# Patient Record
Sex: MALE | Race: ASIAN | NOT HISPANIC OR LATINO
[De-identification: names, ages, dates, MRNs, and addresses within clinical notes are randomized per-mention and may not be internally consistent; named-entity substitution may affect disease eponyms.]

---

## 2022-03-07 PROBLEM — Z00.00 ENCOUNTER FOR PREVENTIVE HEALTH EXAMINATION: Status: ACTIVE | Noted: 2022-03-07

## 2022-03-21 ENCOUNTER — NON-APPOINTMENT (OUTPATIENT)
Age: 32
End: 2022-03-21

## 2022-03-22 ENCOUNTER — NON-APPOINTMENT (OUTPATIENT)
Age: 32
End: 2022-03-22

## 2022-03-22 ENCOUNTER — APPOINTMENT (OUTPATIENT)
Dept: COLORECTAL SURGERY | Facility: CLINIC | Age: 32
End: 2022-03-22
Payer: MEDICAID

## 2022-03-22 VITALS
TEMPERATURE: 98.1 F | HEIGHT: 69 IN | DIASTOLIC BLOOD PRESSURE: 75 MMHG | WEIGHT: 172 LBS | HEART RATE: 80 BPM | SYSTOLIC BLOOD PRESSURE: 109 MMHG | BODY MASS INDEX: 25.48 KG/M2

## 2022-03-22 DIAGNOSIS — K60.2 ANAL FISSURE, UNSPECIFIED: ICD-10-CM

## 2022-03-22 PROCEDURE — 46600 DIAGNOSTIC ANOSCOPY SPX: CPT

## 2022-03-22 PROCEDURE — 99203 OFFICE O/P NEW LOW 30 MIN: CPT | Mod: 25

## 2022-03-22 NOTE — HISTORY OF PRESENT ILLNESS
[FreeTextEntry1] : 33 y/o M presents for initial evaluation \par Reason for visit " anorectal exam" \par \par \par pt report his partner told him he has hemorrhoid 6-7 months ago, but 2 weeks ago advised pt to be seen, which prompted him to come in today. \par pt feels he has a cut to external anus, onset 4-5 months ago. MSM, pt has partner with whom he tried anal receptive sex with partner at that time, and had episode of BRBPR and pain resolved after intercourse. feels pain with BMs intermittently since then. Denies burning, sharp pain, itching, inflammation, pressure.\par Denies use of creams or lotions to area.\par  \par BH: 1-3 x daily varies in stool quality, admits to straining. Admits he has hx constipation since child. \par Sometimes metamucil intermittently last taken last night. \par adequate dietary fiber intake. Drinking 2 L water per day. \par \par \par Denies FMH of colorectal cancer\par Never colonoscopy\par Pt took last evening ibuprofen after feeling sore from exercise. \par Denies allergy to latex. \par \par Pt takes truvada for prep. Pt takes medication for seasonal depression, unsure of name.

## 2022-03-22 NOTE — PHYSICAL EXAM
[FreeTextEntry1] : Medical assistant present for duration of physical examination\par \par General no acute distress, alert and oriented\par Psych calm, pleasant demeanor, responding appropriately to questions\par Nonlabored breathing\par Ambulating without assistance\par Skin normal color and pigment, no visible lesions or rashes\par \par Anorectal Exam:\par Inspection no erythema, induration or fluctuance, no skin excoriation, posterior midline anal fissure with small associated tag/pile, no external hemorrhoids\par ANJEL nontender, no masses palpated, no blood on gloved finger\par \par Procedure: Anoscopy\par \par Pre procedure Diagnosis: anal fissure\par Post procedure Diagnosis: anal fissure\par Anesthesia: none\par Estimated blood loss: none\par Specimen: none\par Complications: none\par \par Consent obtained. Anoscopy was performed by passing a lighted anoscope with lubricant jelly into the anal canal and the entire anal mucosal surface was inspected. Findings included posterior midline anal fissure, no internal hemorrhoidal inflammation.\par \par Patient tolerated examination and procedure well.\par \par \par

## 2022-03-22 NOTE — ASSESSMENT
[FreeTextEntry1] : Exam findings and diagnosis were discussed at length with patient. \par \par Anal fissure seen on exam. Patient reports symptoms began after attempt at anally receptive intercourse and have persisted since, noting pain during BM. Has not had anal intercourse since that experience.\par Sexual counseling provided. Home dilation discussed.\par \par Recommendations including increased fiber intake, adequate daily hydration, stool softeners as needed, and sitz baths as needed and after bowel movements were discussed.\par Medical management, such diltiazem cream TID, was discussed. 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

## 2024-10-17 ENCOUNTER — HOSPITAL ENCOUNTER (OUTPATIENT)
Dept: HOSPITAL 74 - FASU | Age: 34
Discharge: HOME | End: 2024-10-17
Attending: ORTHOPAEDIC SURGERY
Payer: COMMERCIAL

## 2024-10-17 VITALS — BODY MASS INDEX: 24.7 KG/M2

## 2024-10-17 VITALS — HEART RATE: 73 BPM | DIASTOLIC BLOOD PRESSURE: 64 MMHG | RESPIRATION RATE: 18 BRPM | SYSTOLIC BLOOD PRESSURE: 117 MMHG

## 2024-10-17 VITALS — TEMPERATURE: 97.1 F
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
## 2024-10-17 DIAGNOSIS — S83.282A: ICD-10-CM

## 2024-10-17 DIAGNOSIS — S83.212A: Primary | ICD-10-CM

## 2024-10-17 DIAGNOSIS — S83.242A: ICD-10-CM

## 2024-10-17 PROCEDURE — C1713 ANCHOR/SCREW BN/BN,TIS/BN: HCPCS

## 2024-10-17 PROCEDURE — C1768 GRAFT, VASCULAR: HCPCS

## 2024-10-17 PROCEDURE — 0MRP47Z REPLACEMENT OF LEFT KNEE BURSA AND LIGAMENT WITH AUTOLOGOUS TISSUE SUBSTITUTE, PERCUTANEOUS ENDOSCOPIC APPROACH: ICD-10-PCS | Performed by: ORTHOPAEDIC SURGERY

## 2024-10-17 PROCEDURE — 0SBD4ZZ EXCISION OF LEFT KNEE JOINT, PERCUTANEOUS ENDOSCOPIC APPROACH: ICD-10-PCS | Performed by: ORTHOPAEDIC SURGERY

## 2024-10-17 PROCEDURE — 29888 ARTHRS AID ACL RPR/AGMNTJ: CPT

## 2024-10-17 PROCEDURE — 29882 ARTHRS KNE SRG MNISC RPR M/L: CPT

## 2024-10-17 RX ADMIN — ACETAMINOPHEN ONE MG: 10 INJECTION, SOLUTION INTRAVENOUS at 14:35

## 2024-10-17 RX ADMIN — ONDANSETRON PRN MG: 2 INJECTION INTRAMUSCULAR; INTRAVENOUS at 14:53

## 2024-10-17 RX ADMIN — KETOROLAC TROMETHAMINE ONE MG: 30 INJECTION INTRAMUSCULAR; INTRAVENOUS at 14:40
